# Patient Record
Sex: FEMALE | Race: WHITE
[De-identification: names, ages, dates, MRNs, and addresses within clinical notes are randomized per-mention and may not be internally consistent; named-entity substitution may affect disease eponyms.]

---

## 2018-06-19 ENCOUNTER — HOSPITAL ENCOUNTER (OUTPATIENT)
Dept: HOSPITAL 92 - BICMAMMO | Age: 73
Discharge: HOME | End: 2018-06-19
Attending: SPECIALIST
Payer: MEDICARE

## 2018-06-19 DIAGNOSIS — Z80.3: ICD-10-CM

## 2018-06-19 DIAGNOSIS — Z85.3: ICD-10-CM

## 2018-06-19 DIAGNOSIS — Z08: Primary | ICD-10-CM

## 2018-06-19 PROCEDURE — G0279 TOMOSYNTHESIS, MAMMO: HCPCS

## 2018-06-19 PROCEDURE — 77066 DX MAMMO INCL CAD BI: CPT

## 2019-06-20 ENCOUNTER — HOSPITAL ENCOUNTER (OUTPATIENT)
Dept: HOSPITAL 92 - BICMAMMO | Age: 74
Discharge: HOME | End: 2019-06-20
Attending: INTERNAL MEDICINE
Payer: MEDICARE

## 2019-06-20 DIAGNOSIS — Z85.3: ICD-10-CM

## 2019-06-20 DIAGNOSIS — Z08: Primary | ICD-10-CM

## 2019-06-20 PROCEDURE — 77066 DX MAMMO INCL CAD BI: CPT

## 2019-06-20 PROCEDURE — G0279 TOMOSYNTHESIS, MAMMO: HCPCS

## 2019-06-20 NOTE — MMO
Bilateral MAMMO Bilat Diag [facility].

 

CLINICAL HISTORY:

Patient is 74 years old and is seen for diagnostic exam. The patient has no

family history of breast cancer.  The patient has a history of malignant

(generic) in the right breast in 2014. The patient has a history of right

Excisional Biopsy in 2014 - malignant.

 

VIEWS:

The views performed were:  bilateral craniocaudal with tomosynthesis; bilateral

mediolateral oblique with tomosynthesis; and bilateral mediolateral with

tomosynthesis.

 

FILMS COMPARED:

The present examination has been compared to prior imaging studies performed at

[facility] on 06/02/2015, 06/06/2016, 06/13/2017 and 06/19/2018.

 

MAMMOGRAM FINDINGS:

The breasts are heterogeneously dense, which could obscure a lesion on

mammography.

 

Finding 1:  There is a post-surgical scar seen in the right breast.

 

Finding 2:

 

Benign calcifications are noted bilaterally.

 

There are no suspicious masses, suspicious calcifications, or new areas of

architectural distortion.

 

IMPRESSION:

THERE IS NO MAMMOGRAPHIC EVIDENCE OF MALIGNANCY.

 

A ROUTINE FOLLOW-UP MAMMOGRAM IN 1 YEAR IS RECOMMENDED.

 

THE RESULTS OF THIS EXAM WERE SENT TO THE PATIENT.

 

ACR BI-RADS Category 2 - Benign finding

 

MAMMOGRAPHY NOTE:

 1. A negative mammogram report should not delay a biopsy if a dominant of

 clinically suspicious mass is present.

 2. Approximately 10% to 15% of breast cancers are not detected by

 mammography.

 3. Adenosis and dense breasts may obscure an underlying neoplasm.

## 2019-09-03 ENCOUNTER — HOSPITAL ENCOUNTER (OUTPATIENT)
Dept: HOSPITAL 92 - BICMAMMO | Age: 74
Discharge: HOME | End: 2019-09-03
Attending: FAMILY MEDICINE
Payer: MEDICARE

## 2019-09-03 DIAGNOSIS — M85.89: ICD-10-CM

## 2019-09-03 DIAGNOSIS — Z13.820: Primary | ICD-10-CM

## 2019-09-03 PROCEDURE — 77080 DXA BONE DENSITY AXIAL: CPT

## 2019-09-03 NOTE — BD
DEXA BONE DENSITY SCAN:

 

DATE: 9/3/2019.

 

COMPARISON: 

None.

 

HISTORY: 

Postmenopausal female undergoing screening for osteoporosis.

 

FINDINGS: 

 

Lumbar Spine:       BMD (g/cm2)

    L1                0.844              T-Score: -1.3

    L2                0.937              T-Score: -0.8

    L3                1.091              T-Score:  0.1

    L4                1,922              T-Score: -0.4

 

    L1-L4             0.982              T-Score: -0.6

 

Femoral Neck:         0.694              T-Score: -1.4

 

Total Femur:          0.870              T-Score: -0.6

 

FRAX-WHO fracture risk assessment too reports a 10-year fracture risk in an untreated patient at 11% 
for major osteoporotic fracture and 2.2% for a hip fracture.

 

Impression:

There is osteopenia within the femoral neck correlating with a moderately increased risk for fracture
.

 

POS: OFF

## 2020-06-22 ENCOUNTER — HOSPITAL ENCOUNTER (OUTPATIENT)
Dept: HOSPITAL 92 - BICMAMMO | Age: 75
Discharge: HOME | End: 2020-06-22
Attending: INTERNAL MEDICINE
Payer: MEDICARE

## 2020-06-22 DIAGNOSIS — Z85.3: ICD-10-CM

## 2020-06-22 DIAGNOSIS — Z80.3: ICD-10-CM

## 2020-06-22 DIAGNOSIS — Z12.31: Primary | ICD-10-CM

## 2020-06-22 DIAGNOSIS — Z91.89: ICD-10-CM

## 2020-06-22 PROCEDURE — 77063 BREAST TOMOSYNTHESIS BI: CPT

## 2020-06-22 PROCEDURE — 80048 BASIC METABOLIC PNL TOTAL CA: CPT

## 2020-06-22 PROCEDURE — 77067 SCR MAMMO BI INCL CAD: CPT

## 2020-06-22 PROCEDURE — 36415 COLL VENOUS BLD VENIPUNCTURE: CPT

## 2020-06-22 NOTE — MMO
Bilateral MAMMO Bilat Screen [facility].

 

CLINICAL HISTORY:

Patient is 75 years old and is seen for screening. The patient has the following

family history of breast cancer:  mother, malignant (generic) and sister,

malignant (generic).  The patient has a history of malignant (generic) in the

right breast in 2014. The patient has a history of right Excisional Biopsy in

2014 - malignant.

 

VIEWS:

The views performed were:  bilateral craniocaudal with tomosynthesis and

bilateral mediolateral oblique with tomosynthesis.

 

FILMS COMPARED:

The present examination has been compared to prior imaging studies performed at

[facility] on 06/06/2016, 06/13/2017, 06/19/2018 and 06/20/2019.

 

This study has been interpreted with the assistance of computer-aided detection.

 

MAMMOGRAM FINDINGS:

The breasts are heterogeneously dense, which could obscure a lesion on

mammography.

 

Finding 1:  There are stable post operative changes seen in the right breast.

 

Finding 2:  There are stable benign appearing calcifications seen in both

breasts.

 

There are also vascular calcifications.

 

There are no suspicious masses, suspicious calcifications, or new areas of

architectural distortion.

 

IMPRESSION:

THERE IS NO MAMMOGRAPHIC EVIDENCE OF MALIGNANCY.

 

A ROUTINE FOLLOW-UP MAMMOGRAM IN 1 YEAR IS RECOMMENDED.

 

THE RESULTS OF THIS EXAM WERE SENT TO THE PATIENT.

 

ACR BI-RADS Category 2 - Benign finding

 

MAMMOGRAPHY NOTE:

 1. A negative mammogram report should not delay a biopsy if a dominant of

 clinically suspicious mass is present.

 2. Approximately 10% to 15% of breast cancers are not detected by

 mammography.

 3. Adenosis and dense breasts may obscure an underlying neoplasm.

 

 

Reported by: NEHAL PEOPLES MD

Electonically Signed: [id number]